# Patient Record
Sex: FEMALE | Race: WHITE | NOT HISPANIC OR LATINO
[De-identification: names, ages, dates, MRNs, and addresses within clinical notes are randomized per-mention and may not be internally consistent; named-entity substitution may affect disease eponyms.]

---

## 2017-06-12 PROBLEM — Z00.00 ENCOUNTER FOR PREVENTIVE HEALTH EXAMINATION: Status: ACTIVE | Noted: 2017-06-12

## 2019-02-07 ENCOUNTER — APPOINTMENT (OUTPATIENT)
Dept: NEUROLOGY | Facility: CLINIC | Age: 79
End: 2019-02-07
Payer: MEDICARE

## 2019-02-07 VITALS
SYSTOLIC BLOOD PRESSURE: 116 MMHG | HEIGHT: 65 IN | DIASTOLIC BLOOD PRESSURE: 70 MMHG | TEMPERATURE: 97.7 F | WEIGHT: 160 LBS | HEART RATE: 69 BPM | RESPIRATION RATE: 16 BRPM | OXYGEN SATURATION: 99 % | BODY MASS INDEX: 26.66 KG/M2

## 2019-02-07 DIAGNOSIS — H81.10 BENIGN PAROXYSMAL VERTIGO, UNSPECIFIED EAR: ICD-10-CM

## 2019-02-07 DIAGNOSIS — H91.93 UNSPECIFIED HEARING LOSS, BILATERAL: ICD-10-CM

## 2019-02-07 DIAGNOSIS — R26.89 OTHER ABNORMALITIES OF GAIT AND MOBILITY: ICD-10-CM

## 2019-02-07 DIAGNOSIS — R43.2 PARAGEUSIA: ICD-10-CM

## 2019-02-07 DIAGNOSIS — R41.0 DISORIENTATION, UNSPECIFIED: ICD-10-CM

## 2019-02-07 PROCEDURE — 99204 OFFICE O/P NEW MOD 45 MIN: CPT

## 2019-02-07 RX ORDER — METFORMIN HYDROCHLORIDE 500 MG/1
500 TABLET, COATED ORAL
Refills: 0 | Status: ACTIVE | COMMUNITY

## 2019-02-07 RX ORDER — LOSARTAN POTASSIUM AND HYDROCHLOROTHIAZIDE 25; 100 MG/1; MG/1
100-25 TABLET ORAL
Refills: 0 | Status: ACTIVE | COMMUNITY

## 2019-02-07 RX ORDER — RANITIDINE 300 MG/1
300 TABLET ORAL
Refills: 0 | Status: ACTIVE | COMMUNITY

## 2019-02-07 NOTE — CONSULT LETTER
[Dear  ___] : Dear  [unfilled], [Consult Letter:] : I had the pleasure of evaluating your patient, [unfilled]. [Please see my note below.] : Please see my note below. [Consult Closing:] : Thank you very much for allowing me to participate in the care of this patient.  If you have any questions, please do not hesitate to contact me. [Sincerely,] : Sincerely, [FreeTextEntry2] : Ayad Brito, DO\[place], [place]

## 2019-02-07 NOTE — PHYSICAL EXAM
[FreeTextEntry1] : She has high frequency hearing loss bilaterally. [place] is positive with the right ear down. Epley maneuver performed with resolution of vertigo. She is alert and oriented. No cognitive communication deficits. No anosmia. Visual fields are full to confrontation. Pupils are equal and constrict to light. Extraocular movements intact. No sensory loss on face.Smile symmetric. Palate rises symmetrically and tongue protrudes in midline. Neck is supple, no bruits heard. No heart murmurs. No focal weakness of the limbs. Gait and coordination intact. Tendon reflexes active and symmetric. No focal sensory loss.

## 2019-02-07 NOTE — ASSESSMENT
[FreeTextEntry1] : Vestibular exercises explained. Advised audiology followup.\par \par Return for followup in 3 months.

## 2019-02-07 NOTE — HISTORY OF PRESENT ILLNESS
[FreeTextEntry1] : 78-year-old woman complaining of episodic vertigo. 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 Has noted some hearing loss and unsteady gait.

## 2019-05-09 ENCOUNTER — APPOINTMENT (OUTPATIENT)
Dept: NEUROLOGY | Facility: CLINIC | Age: 79
End: 2019-05-09

## 2024-03-13 ENCOUNTER — APPOINTMENT (OUTPATIENT)
Dept: PSYCHIATRY | Facility: CLINIC | Age: 84
End: 2024-03-13
Payer: MEDICARE

## 2024-03-13 ENCOUNTER — TRANSCRIPTION ENCOUNTER (OUTPATIENT)
Age: 84
End: 2024-03-13

## 2024-03-13 PROCEDURE — 90791 PSYCH DIAGNOSTIC EVALUATION: CPT

## 2024-03-13 NOTE — REASON FOR VISIT
[Consultation for neuropsychological evaluation] : Consultation for neuropsychological evaluation [Patient with collateral] : Patient with collateral  [Family Member] : family member [TextBox_17] : Rose (daughter)

## 2024-03-13 NOTE — DISCUSSION/SUMMARY
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 [FreeTextEntry4] : It is recommended that she undergo a full neuropsychological evaluation to determine the nature of the stated concerns and to aid in differential diagnosis and treatment planning.

## 2024-03-13 NOTE — PHYSICAL EXAM
[None] : none [FreeTextEntry2] : uses a cane to ambulate [Cooperative] : cooperative [Euthymic] : euthymic [Full] : full [Clear] : clear [Linear/Goal Directed] : linear/goal directed [Average] : average [WNL] : within normal limits

## 2024-03-13 NOTE — HISTORY OF PRESENT ILLNESS
[FreeTextEntry1] : Gerardo Gimenez is an 83-year-old woman referred for neuropsychological consultation due to cognitive concerns that began about 15 months ago in the context of bereavement following her 's death. Difficulties have gradually worsened over time, with a more rapid/noticeable decline about 4-5 months ago (no reported precipitating factors/triggers). She made errors managing instrumental activities of daily living in recent months (e.g., missed medications, missed bill payments), which prompted her children to begin providing oversight.   She and her daughter reported specific difficulties in the following areas: - Attention/Processing Speed: losing train of thought; slower thinking speed; more difficulty keeping up with the pace of conversations and following news programs  - Executive Functions: mild disorganization - Language: word-finding difficulties - Memory: mild forgetfulness of recent events; occasionally missed medications; misplacing items  Developmental History: unremarkable  Medical History: diabetes, high blood pressure, dizziness/balance issues (onset 2-3 years ago; one recent fall a few months ago)  Psychiatric History / Mood: No past history. As noted above, ongoing bereavement following the death of her , for whom she acted as a primary caregiver for many years. Current mood is "alright." Her daughter denied any behavioral changes.  Substance Use: No current use. Past tobacco use during 20s (3 packs per day).  Medications: blood pressure & diabetes medications (pt unable to recall names/dosages)  Family History: none reported  Educational / Work History: no early academic difficulties or accommodations. Earned a BS at [employer] and FAIZAN at [employer]. 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   Psychosocial History: Born and raised in [place]. Monolingual English speaker.  racial/ethnic background. Currently lives alone. 3 adult children all live locally (~1 mile away) and provide support. Spends her free time knitting and engaging in social activities (e.g., playing Mahjong)

## 2024-04-04 ENCOUNTER — APPOINTMENT (OUTPATIENT)
Dept: PSYCHIATRY | Facility: CLINIC | Age: 84
End: 2024-04-04
Payer: MEDICARE

## 2024-04-04 PROCEDURE — ZZZZZ: CPT

## 2024-04-04 NOTE — HISTORY OF PRESENT ILLNESS
[FreeTextEntry1] : Gerardo Gimenez is an 83-year-old woman referred for neuropsychological consultation due to cognitive concerns that began about 15 months ago in the context of bereavement following her 's death. Difficulties have gradually worsened over time, with a more rapid/noticeable decline about 4-5 months ago (no reported precipitating factors/triggers). She made errors managing instrumental activities of daily living in recent months (e.g., missed medications, missed bill payments), which prompted her children to begin providing oversight.  She and her daughter reported specific difficulties in the following areas: - Attention/Processing Speed: losing train of thought; slower thinking speed; more difficulty keeping up with the pace of conversations and following news programs - Executive Functions: mild disorganization - Language: word-finding difficulties - Memory: mild forgetfulness of recent events; occasionally missed medications; misplacing items  Developmental History: unremarkable  Medical History: diabetes, high blood pressure, dizziness/balance issues (onset 2-3 years ago; one recent fall a few months ago)  Psychiatric History / Mood: No past history. As noted above, ongoing bereavement following the death of her , for whom she acted as a primary caregiver for many years. Current mood is "alright." Her daughter denied any behavioral changes.  Substance Use: No current use. Past tobacco use during 20s (3 packs per day).  Medications: blood pressure & diabetes medications (pt unable to recall names/dosages)  Family History: none reported  Educational / Work History: no early academic difficulties or accommodations. Earned a BS at [employer] and FAIZAN at [employer]. 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  Psychosocial History: Born and raised in [place]. Monolingual English speaker.  racial/ethnic background. Currently lives alone. 3 adult children all live locally (~1 mile away) and provide support. Spends her free time knitting and engaging in social activities (e.g., playing Mahjong).

## 2024-04-04 NOTE — REASON FOR VISIT
[Neuropsychology testing session] : Neuropsychology testing session [Patient] : Patient [Family Member] : family member [TextBox_17] : daughter Pat

## 2024-04-18 ENCOUNTER — APPOINTMENT (OUTPATIENT)
Dept: PSYCHIATRY | Facility: CLINIC | Age: 84
End: 2024-04-18
Payer: MEDICARE

## 2024-04-18 PROCEDURE — 96132 NRPSYC TST EVAL PHYS/QHP 1ST: CPT

## 2024-04-18 PROCEDURE — 96133 NRPSYC TST EVAL PHYS/QHP EA: CPT

## 2024-04-18 PROCEDURE — 96137 PSYCL/NRPSYC TST PHY/QHP EA: CPT

## 2024-04-18 PROCEDURE — 96136 PSYCL/NRPSYC TST PHY/QHP 1ST: CPT

## 2024-04-18 NOTE — REASON FOR VISIT
[Feedback of results of neuropsychological evaluation] : Feedback of results of neuropsychological evaluation [Patient with collateral] : Patient with collateral  [Family Member] : family member [Other: _____] : [unfilled] [TextBox_17] : Mauro (daughter) [FreeTextEntry1] : Neuropsychological Evaluation Report Name: Esperanza Gimenez YOB: 1940 (83) Education: 18 years	 Dates of Service: , , & 2024 Referring Provider: Ayad Brito DO (Internal Medicine) Provider: Ulises Trevino, Ph.D.  The following information was obtained during a clinical interview with the patient and her daughter Rose, a review of medical records, and a psychometric evaluation (see appendix). ________________________________________  Referral & Background Esperanza Gimenez was referred for a neuropsychological assessment due to cognitive concerns that began about 15 months ago in the context of life stress, roughly coinciding with her 's passing. Difficulties have since gradually worsened over time, with a slightly more rapid and noticeable decline within the past 4 to 5 months (in absence of any recent identifiable precipitating factors/triggers).   She and her daughter reported specific difficulties in the following areas: -	Attention - losing train of thought, distractibility -	Processing Speed - difficulty keeping up with the pace of news programs and during conversations -	Executive Functions - mild disorganization -	Language - word-finding difficulties -	Memory - mild forgetfulness of recent events/conversations (sometimes benefits from cues), misplacing items, trouble navigating  Activities of Daily Living: Fully independent, though she described a few errors managing instrumental activities (e.g., missed/late bill payments, infrequently missed medications). Her home is also slightly less organized, which she attributes to decreased interest in keeping things clean than forgetfulness. She currently lives alone in a house, but family lives nearby and spend time with her. Within the past few months, her children have begun to provide some oversight of instrumental activities (e.g., managing finances, appointments, medication).      Medical History: -	Diabetes -	Hypertension -	Bilateral hearing loss -	Benign paroxysmal positional vertigo; onset about 5 years ago (diagnosed by neurologist, Dr. Ty Diehl in 2019; vestibular exercises were provided and she was advised to follow-up with audiology for high-frequency hearing loss).  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 Good benefit from physical therapy exercises (stopped going about 4 months ago). Denied any other sensory/motor or other changes (i.e., no headaches, nausea, weakness, gastrointestinal changes).  Mood / Psychiatric History: She misses her   and is experiencing ongoing symptoms of bereavement since his passing last year. She and her daughter also described mild social isolation in recent months in the absence of cristobal depression or anxiety. There is no other psychiatric history.  Current Medications:  -	losartan potassium-HCTZ 100-25mg -	metformin HCl 500mg -	rantidine HCl 300mg  Family History (in first degree relatives):  -	None reported  Substance Use:  -	Past tobacco use until her 20s (3 packs per day)  Health Behaviors:  -	Sleep - no major changes; she generally sleeps from 12am to 9am; some poor sleep hygiene (watches news late) -	Appetite/Weight - no changes -	Exercise - reduced due to physical limitations  Educational & Occupational History: -	No early academic difficulties or accommodations -	Earned a bachelors degree at [employer] and an FAIZAN at [employer]. 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  Social History:  -	Born and raised in [place], [place] -	Monolingual English speaker -	 racial/ethnic background -	Currently lives alone. 3 adult children live locally and provide frequent support -	Spends her free time knitting and engaging in social activities (e.g., playing Mahjong). ________________________________________  Examination Presentation: Appearance/Behavior:	Arrived on time, accompanied by her daughter. Appropriately dressed and well-groomed. Appropriate eye contact. Positive attitude towards examiner. Orientation:	Oriented to person and situation but not fully to time and place. She provided the correct year, city, and [place] president, and identified the purpose of the current evaluation, but was unable to identify the current day of the week, month, or date (did not want to estimate, "I don't know and don't want to guess"). Sensory and Motor:	Hearing and visual acuity were intact for the purposes of the evaluation. No tics, tremors, or unusual mannerisms were noted. Gait was normal. Ambulated using a cane. Speech and Language:	Receptive language was intact. Speech normal for volume, rhythm, and tone. Thought Process:	Linear and goal-directed. Mood and Affect:	Mood was "alright." Affect was full in range. Engagement:	Appeared motivated and cooperative. Most embedded measures of validity suggested adequate engagement, though her performance on select memory measures indicate suboptimal engagement. Specifically, she was observed to be self-critical and possibly distracted by frustration/worry during memory tasks. Findings are interpreted accordingly.  Results:  Verbal and nonverbal conceptual abilities were in the average-to-high average range, which is consistent with her estimated premorbid intellectual functioning. Performance on brief cognitive screening measure did not reveal evidence of generalized cognitive decline but rather mild word retrieval difficulties and deficits in memory. Similarly, further evaluation of her cognitive functioning indicated many cognitive strengths and isolated deficits in semantic retrieval and learning/recall, as outlined below. -	Attention / Executive Functions: Basic attentional capacity, working memory, and processing speed were within normal limits. Executive functions were also intact, including initiation, set-shifting, cognitive inhibition, novel problem solving, organization, conceptualization, and judgement.  -	Language: Word knowledge and rapid retrieval of words in response to a specific letter were average and repetition of high- and low-probability phrases was intact. In contrast, rapid retrieval of words belonging to a specific category and confrontation naming were impaired, suggesting difficulties accessing semantic networks. -	Visuospatial Processing: Visual scanning, visuoconstruction, and visual organization and integration were all within normal limits. There was no evidence of visual neglect.  -	Memory: As noted above, Ms. Gimenez appeared to have some difficulty fully engaging during memory measures. For instance, she appeared somewhat frustrated during memory measures and often decline to make guesses (i.e., appeared to only respond with information she was 100% confident in). With that in mind, her performance was impaired at the levels of encoding, retrieval, and recognition for verbal and nonverbal information. To elaborate, encoding of non-contextual and contextualized verbal information (i.e., a word list, short stories) was impaired, with minimal benefit from repeated learning trials. Her recall of verbal information after a delay was also impaired and did not significantly improve with the provision of cues. Encoding, recall, and recognition of nonverbal information (basic figures) was also impaired. In general, recognition discriminability was poor and notable for a "no" response bias. Of note, however, when learning was forced, she was able to recognize some well-learned verbal information after a delay, suggesting at least some preservation of memory retention abilities. -	Sensorimotor: Fine motor speed/dexterity, motor programming, and praxis were all within normal limits. -	Mood: On self-report questionnaires assessing emotional functioning, Ms. Gimenez denied any current symptoms of anxiety and endorsed minimal depression (i.e., decreased interest, loss of energy, decreased appetite).  ________________________________________  Impressions There was no evidence of generalized cognitive decline, but rather specific impairments in semantic retrieval and memory. Regarding memory, she encoded only minimal information, tended to forget most of this information over time, and showed minimal benefit from the provision of cues. Of note, memory performances on exam were weaker than expected given that she was not wholly amnestic for recent events. Further, her performance on memory measures was observed in the context of suspected suboptimal engagement, possibly due to awareness of the difficulties she was having. Nonetheless, the degree of memory difficulties observed raises concern for a primary memory retention issue.   Taken together, current findings occur in the context of progressive decline and reflects a change from her premorbid baseline. Given that memory is the primary area of impairment, Ms. Gimenez's presentation is most consistent with a diagnosis of mild neurocognitive impairment (formerly amnestic mild cognitive impairment, aMCI). Although significant functional changes were denied, difficulties could be expected given the level of cognitive impairment and may be evident if she were required to carry out some activities without the assistance of family (e.g., managing finances, keeping track of appointments). Therefore, the possibility of a mild dementia cannot be ruled out at this time.   A diagnosis of mild neurocognitive impairment/aMCI is associated with increased risk for developing a progressive memory disorder, and the observed pattern of deficits is suggestive of an early neurodegenerative process affecting temporal regions such as Alzheimer's disease (AD). With that said, not all individuals with mild neurocognitive impairment/aMCI progress to AD. Furthermore, grief/depressed mood since her 's recent passing should also be considered as contributing factors given that these can impact thinking (though less likely to fully explain the degree of her memory deficits).   As to her and her family's report of a more rapid progression in recent months, major life stressors (e.g., passing of a loved one) can play a role in the development of cognitive decline. Additionally, it is not uncommon for persons like Ms. Gimenez with above average premorbid functioning (e.g., higher education/occupational attainment) to experience rapid cognitive decline, as cognitive reserve often has the effect of postponing decline. However, once a threshold is reached, decline may appear to be more rapid.  Lastly, it should be noted that despite Ms. Gimenez's memory difficulties, she is clearly a very bright woman who continues to present with many areas of relatively preserved cognitive functioning, including in attention, processing speed, executive functioning, visuospatial processing, fine motor speed/dexterity, motor programming, praxis, and aspects of language (i.e., receptive language, semantic knowledge, repetition). These strengths will serve her well moving forward as she continues to manage cognitive symptoms and related functional difficulties.  Feedback was provided to inform Ms. Gimenez and her family about results of this evaluation. Findings and recommendations were discussed in detail, and their questions were addressed. 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	Continued medical follow-up:  -	Neurologic care. Ms. Gimenez is encouraged to follow up with neurology, at which time, the current results may be incorporated into her care. She and his family may contact the [facility] of [facility] for additional information ([phone]). Additional information regarding the services provided can be found in the website [email or web address]. For general neurology consultation for further diagnostic clarification, visit [email or web address]  -	Continued medical maintenance and care. Ms. Gimenez should follow up with her other medical providers to ensure that her overall health and cardiovascular risk factors are closely monitored. Control of vascular risk factors is particularly important for preserving brain/cognitive health.  2.	Safety and supervision:  -	Oversight/assistance. Ms. Gimenez's children assist her with managing her finances and are involved in her medical care. Given her current memory issues and concern for cognitive decline, oversight from a trusted individual for other high-level activities, where an error can be associated with significant cost, is also recommended. This includes, but is not limited to, medication management, carrying out medical recommendations, and navigating new/unfamiliar places in the community. Moving forward, she may need additional assistance.  -	In-home social work services and safety evaluation. Ms. Gimenez and her family may wish to consult at the present time or in the future with the [facility] ([phone]), which provides resources for in-home services, including nursing, home health aide, personal care aide, and physical and occupational therapy. More information can be found at their website [email or web address] -	Other services to consider. Given her report of a recent fall, a device such as Life Alert or Fall Alert may be of benefit in ensuring safety. Fall Alert is a form of fall detection technology that can detect a fall and alert paramedics and the caregiver. [facility] and Alzheimer's Association Safe Return program is a nationwide emergency response service that is designed for individuals with cognitive difficulties. For more information, visit http://www.alz.org/care/dementia-medic-alert-safe-return.asp  3.	Mental health: Management of stress and mood symptoms is recommended, as this may lead to better subjective cognitive functioning in daily life. She would likely benefit from cognitive-behavioral therapy (CBT), which is a present-focused and goal-directed approach to psychotherapy. Options to consider include [facility] at [facility] ([phone]) and [email or web address] (a good online resource for finding local therapists).  4.	Resume physical therapy: Physical therapy can help Ms. Gimenez improve her quality of life, slow cognitive decline, and may delay the need for facility-based care in the future.  5.	Utilize compensatory strategies: Given her many cognitive strengths, Ms. Gmienez is able to use compensatory cognitive strategies to improve functioning. For instance, she may benefit from posted reminders (e.g., on a white board) or auditory cues, such as alarms and/or memory aides. She is also encouraged to "remember recognition." When she can't remember something, she should be provided with recognition cues (e.g,. when trying to remember an item to purchase at a grocery store, ask her "was it dairy or vegetable?, then "was it milk or yogurt?," and so on). In addition, she may benefit from assistance to practice some of the following compensatory memory strategies. -	Repeat information to yourself several times (i.e., rehearsal). If possible, chunk or organize information to make it more manageable. For example, if trying to remember the phone number [phone], it can be helpful to repeat eight sixty six, six fifty one, fourty two hundred rather than eight six six six five one four two zero zero. -	When learning new names, listen carefully, clarify if needed to ensure it was heard correctly, repeat the name to yourself several times, and associate the name with another person with the same name, a rhyming word, or something you can visualize. -	Use mnemonic strategies (e.g., CAT DOG for the grocery list: carrots, apples, tomatoes, dressing, onions, garbage bags). -	Associate novel information with something you already know (i.e., association). Summarize new information in your own words, paraphrase, and explain the concept to someone else. -	If trying torecall information from an event, attempt to recreate your frame of mind from that event (i.e., what you were thinking or feeling) and try to visualize the setting. These strategies have been shown to improve retrieval of what occurred during the event.   6.	Maintain healthy habits: -	Physical exercise. With medical clearance, exercise has been shown to be one of the most effective methods of improving brain and emotional health. Engaging in some activity that raises one's heart rate for at least 20 minutes per day can have many benefits.  -	Healthy eating habits. Maintaining a regular schedule and eating a varied diet that incorporates nutrient dense-foods like fruits, vegetables, lean proteins, and whole (non-processed) foods promotes cardiovascular health, and is essential for maintenance of healthy brain activity. Brain Food by Dr. Yesica Mccoy is a recommended book that offers dietary guidance based on extensive research.  -	Cognitive stimulation. New activities are cognitively arousing. Reading new books or articles, or learning a new skill are ways in which one can remain mentally active.  -	Social activity. Social interaction helps to improve mood and relieve stress, both of which are known to impact cognition. Maintaining social activities also helps to maintain cognitive activity. -	Restful sleep. The following sleep hygiene behaviors may also help to promote restful, consistent sleep, and should be employed daily to mitigate the negative impact of stress on sleep: o	Try to keep a regular sleep/wake schedule and avoid napping during the day. o	Limit necessary naps to 20-30 minutes. o	If needed, eat a light snack at night.  o	Acquire a relaxing nighttime routine (e.g., take a warm bath/shower) before going to bed.  o	Try to make your bedroom quiet, dark, and at a cool, comfortable temperature. o	If you are trying to fall asleep and cannot after 20 minutes, get up and do something relaxing, like listening to music or reading in low light before trying to fall asleep again to prevent tossing and turning.  7.	Additional Resources:  -	Family may benefit from supportive psychotherapy (e.g., caregiver support groups) to help them cope with the emotional stress associated with caring for a loved one with impairing cognitive difficulties. -	Although focused on patients with Alzheimer's disease, families with loved ones with a multitude of cognitive difficulties can benefit from the information provided in the books below: o	The 36-Hour Day: A Family Guide to Caring for People with Alzheimer's Disease, Other Dementias, and Memory Loss in Later Life by Jayda Dominguez and Neal Jensen o	The Insider's Guide to Dementia Care by Virginia Drake, Carmenza Brooks, and Maren Thomas o	National [place] of Aging (www.carlos enrique.nih.gov)   8.	Follow-up: This evaluation can serve as a baseline assessment of her cognitive functioning. A follow-up neuropsychological evaluation can be completed in the future if clinically indicated. In discussion with her and her providers, they may consider re-evaluation in 1 to 2 years, or sooner if needed, to better understand the extent to which her cognitive symptoms are stable versus progressive in nature.